# Patient Record
Sex: MALE | Race: WHITE | RURAL
[De-identification: names, ages, dates, MRNs, and addresses within clinical notes are randomized per-mention and may not be internally consistent; named-entity substitution may affect disease eponyms.]

---

## 2020-07-13 ENCOUNTER — OFFICE VISIT CONVERTED (OUTPATIENT)
Dept: CARDIOLOGY | Facility: CLINIC | Age: 65
End: 2020-07-13
Attending: SPECIALIST

## 2020-07-13 ENCOUNTER — CONVERSION ENCOUNTER (OUTPATIENT)
Dept: CARDIOLOGY | Facility: CLINIC | Age: 65
End: 2020-07-13

## 2020-08-10 ENCOUNTER — CONVERSION ENCOUNTER (OUTPATIENT)
Dept: CARDIOLOGY | Facility: CLINIC | Age: 65
End: 2020-08-10
Attending: SPECIALIST

## 2021-05-10 NOTE — PROCEDURES
Procedure Note      Patient Name: Teodoro Lyon   Patient ID: [id number]   Sex: Male   YOB: 1955    Primary Care Provider: Thompson Song   Referring Provider: Thompson Song    Visit Date: August 10, 2020    Provider: Chaka Gonzalez MD   Location: [facility]   Location Address: [address] [place] [address]  [id number]   Location Phone: [phone]          FINAL REPORT   TRANSTHORACIC ECHOCARDIOGRAM REPORT    Diagnosis: CHF (Congestive Heart Failure) and Coronary artery disease; s/p coronary artery bypass graft surgery   Height: HEIGHT Weight: 6'1 B/P: 112/67 BSA: 2.09   Tech: [facility]   MEASUREMENTS:  RVID (Diastole) : RVID. (NORMAL: 0.7 to 2.4 cm max)   LVID (Systole): 3 cm (Diastole): 5 cm . (NORMAL: 3.7 - 5.4 cm)   Posterior Wall Thickness (Diastole): 1.2 cm. (NORMAL: 0.8 - 1.1 cm)   Septal Thickness (Diastole): 1.2 cm. (NORMAL: 0.7 - 1.2 cm)   LAID (Systole): 4.4 cm. (NORMAL: 1.9 - 3.8 cm)   Aortic Root Diameter (Diastole): 3.7 cm. (NORMAL: 2.0 - 3.7 cm)   DOPPLER:  E/A ratio 1.3 (NORMAL 0.8-2.0)   DT: 238 msec (NORMAL 140-240 msec.)   IVRT 121 m/sec (NORMAL  m/sec.)   E/E': 8 (NORMAL <8 avg.)   COMMENTS:  The patient underwent 2-D, M-Mode, and Doppler examination, including pulse-wave, continuous-wave, and color-flow analysis; the study is technically adequate. The following was observed:   FINDINGS:  AORTIC VALVE: fibrotic.   MITRAL VALVE: fibrotic.   TRICUSPID VALVE: Normal.   PULMONIC VALVE: Not well visualized.   LEFT ATRIUM: enlarged. No intracavitary masses or clots seen. LA volume index is 35 mL/m2.   AORTIC ROOT: Normal in size with adequate motion.   LEFT VENTRICLE: Normal left ventricular systolic function. Ejection fraction 60%.   RIGHT ATRIUM: Normal.   RIGHT VENTRICLE: Normal size and function.   PERICARDIUM: Unremarkable. No evidence of effusion.   INFERIOR VENA CAVA: Diameter is 1.3 cm.   DOPPLER: Doppler examination of the aortic, mitral, tricuspid,  and pulmonary valves was performed. Normal pulmonary artery systolic pressure by Doppler. Shows trace mitral regurgitation and trace tricuspid regurgitation.      CONCLUSION    1. Fibrotic mitral and aortic valves.  2. Normal left ventricle systolic function.   3. Trace mitral regurgitation.  4. Trace tricuspid regurgitation.   5. Enlarged left atrium.      Chaka Gonzalez MD  MARIANA/wt                 Electronically Signed by: Caitlyn Christopher-, -Author on August 11, 2020 07:19:00 AM  Electronically Co-signed by: Chaka Gonzalez MD -Reviewer on August 13, 2020 08:34:17 PM

## 2021-05-13 NOTE — PROGRESS NOTES
"   Progress Note      Patient Name: Teodoro Lyon   Patient ID: [id number]   Sex: Male   YOB: 1955    Primary Care Provider: Thompson Song   Referring Provider: Thompson Song    Visit Date: July 13, 2020    Provider: Chaka Gonzalez MD   Location: [facility]   Location Address: [address] [place] [address]  [id number]   Location Phone: [phone]          Chief Complaint  · Coronary artery disease  · Pedal edema      History Of Present Illness  Teodoro Lyon is a 64 year old /White male with a history of coronary artery disease; history of bypass graft surgery; history of PCI. He recently has worsening pedal edema for the last couple of months. He is on low dose Lasix in view of kidney disease. He also has shortness of breath on exertion. No chest pain. No PND or orthopnea.   Medications  Meds at present include: Amlodipine 10 mg qd; Simvastatin 40 mg qd; Lasix 10 mg qd; Metoprolol 25 mg bid; Brilinta 90 mg bid; Trelegy 1 puff qd; Omeprazole 40 mg,   PAST MEDICAL HISTORY: negative for diabetes; posiitive for hypertension; history of coronary artery bypass graft surgery; pharyngeal cancer.   FAMILY HISTORY: negative for diabetes and heart disease. Positive for hypertension.   PSYCHO/SOCIAL HISTORY: positive for depression. Does not drink alcohol and he does smoke.       Review of Systems  · Cardiovascular  o Admits  o : pedal edema, shortness of breath and chest pain.  o Denies  o : palpitations (fast, fluttering, or skipping beats.  · Respiratory  o Admits  o : asthma or wheezing      Vitals  Date Time BP Position Site L\R Cuff Size HR RR TEMP (F) WT  HT  BMI kg/m2 BSA m2 O2 Sat        07/13/2020 07:40 /67 Sitting    69 - R   188lbs 2oz 6'  1\" 24.82 2.1           Physical Examination  · Constitutional  o Appearance  o : Alert and Oriented X3. The patient is well built and well nourished.  · Respiratory  o Inspection of Chest  o : No chest wall " deformities, moving equal  o Auscultation of Lungs  o : Good air entry with vesicular breath sounds  · Cardiovascular  o Heart  o :   § Auscultation of Heart  § : S1 and S2 are regular. No S3. No S4. No murmurs.  o Peripheral Vascular System  o :   § Extremities  § : Peripheral pulses were well felt. 2+ pedal edema. No cyanosis.  · Gastrointestinal  o Abdominal Examination  o : No masses or tenderness noted.   · EKG  o EKG  o : was done today  o Indications  o : coronary artery disease  o Results  o : EKG shows sinus rhythm; intraventricular conduction delay; PAC's.          Assessment     IMPRESSION/PLAN  1. Coronary artery disease; history of bypass graft surgery. Continue current dose of Brilinta. Continue current dose of Metoprolol.     2. Essential hypertension controlled. Decrease Amlodipine to 5 mg qd, my be the cause of his pedal edema.  3. Chronic systolic diastolic heart failure. Repeat an echocardiogram to evaluate left ventricle systolic function. Check his BMP levels. Increase Lasix to 40 mg qd. Check his BNP in a month or so.       MD MARIANA Wilkinson/wt    cc: Dr. Song         Plan  · Instructions  o This note was transcribed by Ally Christopher. MARIANA/wt  o The above service was transcribed by Ally Christopher on my behalf and I attest to the accuracy of the note. MARIANA            Electronically Signed by: Caitlyn Christopher-, -Author on July 16, 2020 02:35:03 PM  Electronically Co-signed by: Chaka Gonzalez MD -Reviewer on July 20, 2020 08:51:43 AM

## 2021-05-15 VITALS
BODY MASS INDEX: 24.93 KG/M2 | SYSTOLIC BLOOD PRESSURE: 112 MMHG | DIASTOLIC BLOOD PRESSURE: 67 MMHG | HEIGHT: 73 IN | WEIGHT: 188.12 LBS | HEART RATE: 69 BPM